# Patient Record
Sex: FEMALE | Race: BLACK OR AFRICAN AMERICAN
[De-identification: names, ages, dates, MRNs, and addresses within clinical notes are randomized per-mention and may not be internally consistent; named-entity substitution may affect disease eponyms.]

---

## 2016-05-20 VITALS
SYSTOLIC BLOOD PRESSURE: 138 MMHG | SYSTOLIC BLOOD PRESSURE: 138 MMHG | DIASTOLIC BLOOD PRESSURE: 90 MMHG | DIASTOLIC BLOOD PRESSURE: 90 MMHG | DIASTOLIC BLOOD PRESSURE: 90 MMHG | DIASTOLIC BLOOD PRESSURE: 90 MMHG | SYSTOLIC BLOOD PRESSURE: 138 MMHG | SYSTOLIC BLOOD PRESSURE: 138 MMHG

## 2017-07-24 ENCOUNTER — HOSPITAL ENCOUNTER (OUTPATIENT)
Dept: HOSPITAL 61 - MAMMO | Age: 57
Discharge: HOME | End: 2017-07-24
Attending: INTERNAL MEDICINE
Payer: COMMERCIAL

## 2017-07-24 DIAGNOSIS — Z12.31: Primary | ICD-10-CM

## 2017-07-24 NOTE — RAD
DATE: 7/24/2017



EXAM: DIGITAL SCREEN BILAT W/CAD



HISTORY: Routine screening



COMPARISON: 7/22/2016



This study was interpreted with the benefit of Computerized Aided Detection

(CAD).





The breast parenchyma shows scattered fibroglandular densities. Breast

parenchyma level B.





FINDINGS: A faint nodular opacity in the lateral aspect of the right breast is

unchanged. No new or enlarging breast densities are seen. Several faint

scattered microcalcifications are noted. No suspicious microcalcifications are

seen.  





IMPRESSION: Stable mammograms without evidence of malignancy.





BI-RADS CATEGORY: 2 BENIGN FINDING(S)



RECOMMENDED FOLLOW-UP: 12M 12 MONTH FOLLOW-UP



PQRS compliance statement: Patient information was entered into a reminder

system with a target due date     for the next mammogram.



Mammography is a sensitive method for finding small breast cancers, but it

does not detect them all and is not a substitute for careful clinical

examination.  A negative mammogram does not negate a clinically suspicious

finding and should not result in delay in biopsying a clinically suspicious

abnormality.



"Our facility is accredited by the American College of [employer] Mammography

Program."

## 2017-11-29 ENCOUNTER — HOSPITAL ENCOUNTER (OUTPATIENT)
Dept: HOSPITAL 61 - ECHO | Age: 57
Discharge: HOME | End: 2017-11-29
Attending: INTERNAL MEDICINE
Payer: COMMERCIAL

## 2017-11-29 DIAGNOSIS — R06.09: ICD-10-CM

## 2017-11-29 DIAGNOSIS — Z87.891: ICD-10-CM

## 2017-11-29 DIAGNOSIS — I10: ICD-10-CM

## 2017-11-29 DIAGNOSIS — I49.5: Primary | ICD-10-CM

## 2017-11-29 PROCEDURE — 78452 HT MUSCLE IMAGE SPECT MULT: CPT

## 2017-11-29 PROCEDURE — 93017 CV STRESS TEST TRACING ONLY: CPT

## 2017-11-29 PROCEDURE — 96375 TX/PRO/DX INJ NEW DRUG ADDON: CPT

## 2017-11-29 PROCEDURE — A9500 TC99M SESTAMIBI: HCPCS

## 2017-11-29 PROCEDURE — 96376 TX/PRO/DX INJ SAME DRUG ADON: CPT

## 2017-11-29 PROCEDURE — 96374 THER/PROPH/DIAG INJ IV PUSH: CPT

## 2017-11-29 PROCEDURE — 93306 TTE W/DOPPLER COMPLETE: CPT

## 2017-11-30 NOTE — RAD
--------------- APPROVED REPORT --------------





Test Type:          Pharmacological

Stress Nurse/Tech: Lynda Dawkins R.N.

Test Indications: dyspnea

Cardiac History: htn, smoker

Medications:     see ehr

Medical History: see ehr

Resting ECG:     SR

Resting Heart Rate: 55 bpm

Resting Blood Pressure: 135/87mmHg

Pretest Chest Pain: No chest pain



Nurse/Tech Notes

Lungs cta, heart tones regular, good radial pulse

Consent: The procedure was explained to the patient in lay terms. Informed consent was witnessed. Pa
eout was entered into [facility]. History and Stress Test performed by CAMI Beach, ELLIE (R) 
(N)



Pharm. Details

Pharmacologic stress testing was performed using 0.4mg per 5ml of regadenoson given intravenously ove
r 7-10 seconds.



Stress Symptoms

No chest pain or symptoms.



POST EXERCISE

Reason for Termination: Infusion complete

Target HR: No

Max HR: 90 bpm

Max Blood Pressure: 133/72mmHg

Chest Pain: No. 

Arrhythmia: No. 

ST Change: No. T wave inversion in lateral leads that resolved during recovery



INTERPRETATION

Stress EKG Conclusion: Baseline EKG showed sinus rhythm.  No ischemic changes at peak stress.  No arr
hythmias.



Imaging Protocol

IMAGE PROTOCOL: Rest Tc-99m/stress Tc-99m 2 days



Rest:            Stress:         Viability:   

Radiopharm.Tc99m CgwoxqblsVk33q Sestamibi

Zkuu28cVn            35mCi            

Img Date  11/29/2017 11/30/2017      

Inj-Img Iayl39tkg.           60min.           



Rest Admin Site:IV - Right AntecubitalAdministrator:DEMETRIO Byers

Stress Admin Site: IV - Right AntecubitalAdministrator: CAMI Beach, THERESET (R)(N)



STRESS DATA

End Diast. Vol.115.0mlAv. Heart Rate73.0bpm

End Syst. Vol.41.0mlCO Index BSA5.4L/min

Myocardial Pqqm833.0gEject. Jxkxogxx43.0%



Stress Rates

Pk. Fill Rate3.18EDV/secLVtime Pk. Fill 246.85msec

Pk. Empty Rate3.10ESV/secLVtime Pk. Imsiv386.72msec

1/3 Pk. Fill0.72EDV/sec



Stress Scores

Regional WT1.00Summed WT9.00

Regional WM0.00Summed WM1.00



Study quality was good.  Left Ventricular size was Normal at Rest and Stress.

Lung uptake was Normal.  Left Ventricular ejection fraction is 64%.

The rest and stress images show normal perfusion, normal contraction and thickening.



LV Perf. Quant

17 Seg. SSS0.00

17 Seg. SRS3.00

17 Seg. SDS0.00

Stress Defect Extent (% LAD)0.00Rest Defect Extent (% LAD)13.10Rev. Defect Extent (% LAD)0.00

Stress Defect Extent (% LCX) 0.00Rest Defect Extent (% LCX)0.00Rev. Defect Extent (% LCX)0.00

Stress Defect Extent (% RCA)0.00Rest Defect Extent (% RCA)0.00Rev. Defect Extent (% RCA)0.00

Stress Defect Extent (% BRAVO)0.00Rest Defect Extent (% BRAVO)5.00Rev. Defect Extent (% BRAVO)0.00



Conclusion

1. Regadenoson cardioisotope stress test did not show any evidence of ischemia or infarct.

2. Normal left ventricular systolic function with ejection fraction calculated at 64%.

3. Low risk for cardiac events.

## 2018-08-06 ENCOUNTER — HOSPITAL ENCOUNTER (OUTPATIENT)
Dept: HOSPITAL 61 - MAMMO | Age: 58
Discharge: HOME | End: 2018-08-06
Attending: INTERNAL MEDICINE
Payer: COMMERCIAL

## 2018-08-06 DIAGNOSIS — I11.0: ICD-10-CM

## 2018-08-06 DIAGNOSIS — Z87.891: ICD-10-CM

## 2018-08-06 DIAGNOSIS — E78.5: ICD-10-CM

## 2018-08-06 DIAGNOSIS — Z12.31: Primary | ICD-10-CM

## 2018-08-06 DIAGNOSIS — I50.32: ICD-10-CM

## 2018-08-06 PROCEDURE — 77067 SCR MAMMO BI INCL CAD: CPT

## 2018-11-16 ENCOUNTER — HOSPITAL ENCOUNTER (OUTPATIENT)
Dept: HOSPITAL 61 - MRI | Age: 58
Discharge: HOME | End: 2018-11-16
Attending: INTERNAL MEDICINE
Payer: COMMERCIAL

## 2018-11-16 DIAGNOSIS — D35.2: Primary | ICD-10-CM

## 2018-11-16 DIAGNOSIS — D18.03: ICD-10-CM

## 2018-11-16 PROCEDURE — A9585 GADOBUTROL INJECTION: HCPCS

## 2018-11-16 PROCEDURE — 74183 MRI ABD W/O CNTR FLWD CNTR: CPT

## 2018-11-16 PROCEDURE — 70553 MRI BRAIN STEM W/O & W/DYE: CPT

## 2018-11-16 NOTE — RAD
MRI Brain with and without contrast

 

History: Macroadenoma

 

Technique: Multiplanar, multi sequential pre and postcontrast MR imaging 

was performed of the brain.

 

Comparison: July 30, 2009

 

Findings: There is persistent focus of nonenhancement of the sella with 

associated T2 hyperintense signal although overall decreased in size, now 

primarily involving the right aspect of the sella with slight extent to 

the left of the midline. This now measures about 1.2 cm transverse by 0.8 

cm CC by 0.9 cm AP. Previously this measured about 1.6 cm transverse by 

0.9 cm CC by 1 cm AP and previously extended more to the left. There is 

again mild deviation of the infundibulum to the left which is less 

displaced on this exam than previously.

 

There is no new intracranial abnormality. There is again minimal T2 and 

FLAIR hyperintense signal of the supratentorial parenchyma greatest of the

bilateral periatrial white matter right greater than left, also a few tiny

foci of the deep white matter overall similar. There is no nodular 

parenchymal or leptomeningeal enhancement. There is preservation of the 

major intracranial flow-voids at the skull base. The cerebellar tonsils 

are normal in location.  There is patchy mild-to-moderate ethmoid air cell

mucosal thickening, also minimally of the maxillary sinuses greater on the

right and also minimally of the right sphenoid sinus. There is mild 

bilateral frontal sinus mucosal thickening. Findings were present 

previously, somewhat decreased mucosal thickening of the ethmoid air cells

in the interval. The mastoid air cells are aerated. There is preserved 

marrow signal of the clivus.

 

 

Impression:

1. There is persistent focus of nonenhancing T2 hyperintense signal of the

sella although different morphology than previously with decreased mass 

effect as well as displacement of the infundibulum, overall smaller in 

size and now primarily more greatly affecting the right aspect of the 

sella. 

2. There is similar minimal T2 and FLAIR hypertense signal of the 

supratentorial white matter, nonspecific findings possibly due to chronic 

microvascular ischemic disease, no abnormal intracranial enhancement.

3. There is paranasal sinus mucosal thickening as stated, present 

previously although somewhat decreased of the ethmoid air cells in 

interval.

 

Electronically signed by: Mani Berry MD (11/16/2018 12:13 PM) 

[facility]

## 2018-11-16 NOTE — RAD
Indication: Liver hemangioma. No symptoms.

 

TECHNIQUE: MRI of the abdomen with in and out of phase, axial without and 

with fat sat T2-weighted images, axial diffusion-weighted images, coronal 

T2 fat sat, axial T1-weighted image sequences were obtained. After 

infusion of contrast, axial T1-weighted image sequences were obtained at 

multiple intervals.

 

COMPARISON:

 

FINDINGS:

Heart is normal in size. No pericardial or pleural effusion. Clear lung 

bases.

The liver is normal in morphology without hepatic steatosis. There is a 8 

mm T2 hyperintense/T1 hypointense arterially enhancing lesion in the 

segment 4A of the liver (series 4 image 9) compatible with flash filling 

hemangioma. There is a segment 6 subcapsular 1.9 x 1.4 cm arterially 

enhancing wedge-shaped focus without T1 or T2 correlates most likely 

perfusion anomaly. No other liver lesions seen. Spleen is within normal 

limits. No gallstones or pericholecystic fluid. Diffuse fatty infiltration

is seen in the pancreas. No focal pancreatic lesion. Main pancreatic duct 

is not dilated. No intra or extrahepatic biliary duct dilation. Adrenal 

glands demonstrate no nodularity. No hydronephrosis. 1.4 cm simple cyst is

seen in the right kidney. No suspicious renal lesion. No enlarged 

retroperitoneal adenopathy. No bowel obstruction. No enhancing bone 

lesion.

 

IMPRESSION:

1. Small hemangioma in segment 4A. No suspicious liver lesion.

 

Electronically signed by: Ayaz Ventura DO (11/16/2018 12:14 PM) [id number]

## 2019-07-16 ENCOUNTER — HOSPITAL ENCOUNTER (OUTPATIENT)
Dept: HOSPITAL 61 - ECHO | Age: 59
Discharge: HOME | End: 2019-07-16
Attending: INTERNAL MEDICINE
Payer: COMMERCIAL

## 2019-07-16 DIAGNOSIS — I25.118: ICD-10-CM

## 2019-07-16 DIAGNOSIS — I51.7: Primary | ICD-10-CM

## 2019-07-16 PROCEDURE — 93306 TTE W/DOPPLER COMPLETE: CPT

## 2019-07-16 NOTE — CARD
MR#: [id number]

Account#: [id number]

Accession#: [id number]

Date of Study: 07/16/2019

Ordering Physician: BALDEV DOUGLAS, 

Referring Physician: BALDEV DOUGLAS, 

Tech: Hanane Sarkar DARSHANA





--------------- APPROVED REPORT --------------





EXAM: Two-dimensional and M-mode echocardiogram with Doppler and color Doppler.



Other Information 

Quality : AverageHR: 66bpm

Rhythm : NSR



INDICATION

Hypertension/HCVD



2D DIMENSIONS 

RVDd3.2 (2.9-3.5cm)Left Atrium(2D)3.4 (1.6-4.0cm)

IVSd1.3 (0.7-1.1cm)Aortic Root(2D)3.0 (2.0-3.7cm)

LVDd5.0 (3.9-5.9cm)LVOT Diameter1.8 (1.8-2.4cm)

PWd1.1 (0.7-1.1cm)LVDs3.1 (2.5-4.0cm)

FS (%) 38.5 %SV80.4 ml

LVEF(%)65.0 (>50%)



M-Mode DIMENSIONS 

Left Atrium(MM)3.65 (2.5-4.0cm)Aortic Root3.51 (2.2-3.7cm)



Aortic Valve

AoV Peak Alfred.154.0cm/sAoV VTI32.0cm

AO Peak GR.9.5mmHgLVOT Peak Alfred.113.2cm/s

AO Mean GR.5mmHgAVA (VMAX)1.86cm2

CORY   (VTI)1.90cm2



Mitral Valve

MV E Ycqpfmnn04.0cm/sMV DECEL MBCK307ad

MV A Qheqclhq60.5cm/sE/A  Ratio0.8



Pulmonary Valve

PV Peak Woyucafn68.1cm/s



Tricuspid Valve

TR P. Wwwipwas319fb/sRAP LVPPUTEV9idDl

TR Peak Gr.31gsQqIMST59qhUx



Pulmonary Vein

S1 Yxgyyqve75.0cm/sD2 Dxiekxxb73.4cm/s

PVa fnuhyjvi34evgo



 LEFT VENTRICLE 

The left ventricle is normal size. There is mild concentric left ventricular hypertrophy. The left ve
ntricular systolic function is normal. The Ejection Fraction is 60-65%. There is normal LV segmental 
wall motion. Transmitral Doppler flow pattern is Grade I-abnormal relaxation pattern.



 RIGHT VENTRICLE 

The right ventricle is normal size. There is normal right ventricular wall thickness. The right ventr
icular systolic function is normal.



 ATRIA 

The left atrium size is normal. The right atrium size is normal. The interatrial septum is intact wit
h no evidence for an atrial septal defect or patent foramen ovale as noted on 2-D or Doppler imaging.




 AORTIC VALVE 

The aortic valve is normal in structure and function. The aortic valve is trileaflet. Doppler and Col
or Flow revealed no significant aortic regurgitation. There is no significant aortic valvular stenosi
s. There is no aortic valvular vegetation.



 MITRAL VALVE 

The mitral valve is normal in structure and function. There is no evidence of mitral valve prolapse. 
There is no mitral valve stenosis. Doppler and Color-flow revealed trace mitral regurgitation.



 TRICUSPID VALVE 

The tricuspid valve is normal in structure and function. Doppler and Color Flow revealed trace tricus
pid regurgitation. The PA pressure was estimated at 32 mmHg. There is no tricuspid valve prolapse or 
vegetation. There is no tricuspid valve stenosis.



 PULMONIC VALVE 

The pulmonary valve is normal in structure and function. Doppler and Color Flow revealed no pulmonic 
valvular regurgitation. There is no pulmonic valvular stenosis.



 GREAT VESSELS 

The aortic root is normal in size. The ascending aorta is normal in size. The IVC is normal in size a
nd collapses >50% with inspiration.



 PERICARDIAL EFFUSION 

There is no evidence of significant pericardial effusion.



Critical Notification

Critical Value: No



<Conclusion>

The left ventricular systolic function is normal.

The Ejection Fraction is 60-65%.

There is normal LV segmental wall motion.

Transmitral Doppler flow pattern is Grade I-abnormal relaxation pattern.

Trace mitral regurgitation.

Trace tricuspid regurgitation.

The PA pressure was estimated at 32 mmHg.

There is no evidence of significant pericardial effusion.



Signed by : Randolph Reyes, 

Electronically Approved : 07/16/2019 13:34:48

## 2019-09-12 ENCOUNTER — HOSPITAL ENCOUNTER (EMERGENCY)
Dept: HOSPITAL 61 - ER | Age: 59
Discharge: HOME | End: 2019-09-12
Payer: COMMERCIAL

## 2019-09-12 VITALS
SYSTOLIC BLOOD PRESSURE: 155 MMHG | SYSTOLIC BLOOD PRESSURE: 155 MMHG | SYSTOLIC BLOOD PRESSURE: 155 MMHG | DIASTOLIC BLOOD PRESSURE: 79 MMHG | DIASTOLIC BLOOD PRESSURE: 79 MMHG | DIASTOLIC BLOOD PRESSURE: 79 MMHG | DIASTOLIC BLOOD PRESSURE: 79 MMHG | SYSTOLIC BLOOD PRESSURE: 155 MMHG | DIASTOLIC BLOOD PRESSURE: 79 MMHG | SYSTOLIC BLOOD PRESSURE: 155 MMHG | SYSTOLIC BLOOD PRESSURE: 155 MMHG | DIASTOLIC BLOOD PRESSURE: 79 MMHG

## 2019-09-12 VITALS — WEIGHT: 270 LBS | BODY MASS INDEX: 46.1 KG/M2 | HEIGHT: 64 IN

## 2019-09-12 DIAGNOSIS — J01.01: Primary | ICD-10-CM

## 2019-09-12 DIAGNOSIS — I11.0: ICD-10-CM

## 2019-09-12 DIAGNOSIS — I50.9: ICD-10-CM

## 2019-09-12 DIAGNOSIS — G89.29: ICD-10-CM

## 2019-09-12 DIAGNOSIS — Z90.710: ICD-10-CM

## 2019-09-12 DIAGNOSIS — F31.9: ICD-10-CM

## 2019-09-12 DIAGNOSIS — F20.9: ICD-10-CM

## 2019-09-12 DIAGNOSIS — E78.00: ICD-10-CM

## 2019-09-12 DIAGNOSIS — Z98.51: ICD-10-CM

## 2019-09-12 DIAGNOSIS — Z88.1: ICD-10-CM

## 2019-09-12 PROCEDURE — 99282 EMERGENCY DEPT VISIT SF MDM: CPT

## 2019-09-12 NOTE — PHYS DOC
Past Medical History


Past Medical History:  Bipolar, Bronchitis, CHF, Depression, High Cholesterol, 

Hypertension, Schizophrenia, Other


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 

drug user, CHRONIC BACK PAIN


Past Surgical History:  Hysterectomy, Tubal ligation


Alcohol Use:  None


Drug Use:  None





Adult General


Chief Complaint


Chief Complaint:  Congestion





HPI


HPI





Patient is a 59  year old female that presents with sinus congestion has been 

ongoing for week. Associated symptoms include sinus pressure in her maxillary 

sinuses. The patient has not tried any treatments at home for this. Denies 

Cough. Denies any fevers. Denies any pain.





Review of Systems


Review of Systems





Constitutional: Denies fever or chills []


Eyes: Denies change in visual acuity, redness, or eye pain []


HENT: Reports nasal congestion and sinus pressure.


Respiratory: Denies cough or shortness of breath []


Cardiovascular: No additional information not addressed in HPI []


GI: Denies abdominal pain, nausea, vomiting, bloody stools or diarrhea []


: Denies dysuria or hematuria []


Musculoskeletal: Denies back pain or joint pain []


Integument: Denies rash or skin lesions []


Neurologic: Denies headache, focal weakness or sensory changes []


Endocrine: Denies polyuria or polydipsia []





Complete systems were reviewed and found to be within normal limits, except as 

documented in this note.





Allergies


Allergies





Allergies








Coded Allergies Type Severity Reaction Last Updated Verified


 


  amoxicillin Adverse Reaction Intermediate nausea 12/3/14 No











Physical Exam


Physical Exam





Constitutional: Well developed, well nourished, no acute distress, non-toxic 

appearance. []


HENT: Normocephalic, atraumatic, bilateral external ears normal, oropharynx 

moist, no oral exudates, nose normal. Sinus tenderness.


Eyes: PERRLA, EOMI, conjunctiva normal, no discharge. [] 


Neck: Normal range of motion, no tenderness, supple, no stridor. [] 


Cardiovascular:Heart rate regular rhythm, no murmur []


Lungs & Thorax:  Bilateral breath sounds clear to auscultation []


Abdomen: Bowel sounds normal, soft, no tenderness, no masses, no pulsatile 

masses. [] 


Skin: Warm, dry, no erythema, no rash. [] 


Back: No tenderness, no CVA tenderness. [] 


Extremities: No tenderness, no cyanosis, no clubbing, ROM intact, no edema. [] 


Neurologic: Alert and oriented X 3, normal motor function, normal sensory 

function, no focal deficits noted. []


Psychologic: Affect normal, judgement normal, mood normal. []





EKG


EKG


[]





Radiology/Procedures


Radiology/Procedures


[]





Course & Med Decision Making


Course & Med Decision Making


Pertinent Labs and Imaging studies reviewed. (See chart for details)





The patient appears to have a sinus infection. Will give a steroid in the ER 

(Dexamethasone). Also recommended the patient drink plenty of fluid. Discussed 

with patient about taking Mucinex and Flonase. The patient asked if I could 

write a script for Mucinex and I agreed. The patient has Flonase at home and 

states she has saline solution at home. Recommended patient use this as well.





Dragon Disclaimer


Dragon Disclaimer


This electronic medical record was generated, in whole or in part, using a voice

 recognition dictation system.





Departure


Departure


Impression:  


   Primary Impression:  


   Sinusitis


Disposition:  01 HOME, SELF-CARE


Condition:  STABLE


Referrals:  


DUKE FONTENOT MD (PCP)








JS LINDSAY MD


Patient Instructions:  Sinusitis





Additional Instructions:  


Thank you for visiting [facility]. We appreciate you trusting us 

with your care. If any additional problems come up don't hesitate to return to 

visit us. Please follow up with your primary care provider so they can plan 

additional care if needed and know about the problem that you had. If symptoms 

worsen come back to the Emergency Department. Any concerning symptoms that start

 such as chest pain, shortness of air, weakness or numbness on one side of the 

body, running high fevers or any other concerning symptoms return to the ER.





Please fill your medications at any pharmacy and follow the prescription 

instructions.





As we discussed you can also try Flonase and Saline solution at home. If 

symptoms continue please follow up with ENT.


Scripts


Guaifenesin (MUCINEX) 600 Mg Tablet.er


1 TAB PO BID PRN for CONGESTION, #20 TAB


   Prov: MICHELLE TROY         9/12/19





Problem Qualifiers








   Primary Impression:  


   Sinusitis


   Sinusitis location:  maxillary  Chronicity:  acute  Recurrence:  recurrent  

   Qualified Codes:  J01.01 - Acute recurrent maxillary sinusitis








MICHELLE TROY          Sep 12, 2019 09:37

## 2019-11-18 ENCOUNTER — HOSPITAL ENCOUNTER (OUTPATIENT)
Dept: HOSPITAL 61 - MAMMO | Age: 59
Discharge: HOME | End: 2019-11-18
Attending: INTERNAL MEDICINE
Payer: COMMERCIAL

## 2019-11-18 DIAGNOSIS — Z12.31: Primary | ICD-10-CM

## 2019-11-18 DIAGNOSIS — N64.89: ICD-10-CM

## 2019-11-18 PROCEDURE — 77067 SCR MAMMO BI INCL CAD: CPT

## 2019-11-18 NOTE — RAD
DATE: 11/18/2019.



EXAM: DIGITAL SCREEN BILAT W/CAD.



HISTORY: Routine mammographic screening.



COMPARISON: 8/6/2018.



This study was interpreted with the benefit of Computerized Aided Detection

(CAD).



FINDINGS:



Breast Density:  SCATTERED The breast parenchyma shows scattered

fibroglandular densities. Breast parenchyma level B..



There are no suspicious masses, microcalcifications or architectural

distortion.  Scattered calcifications are benign. The parenchymal pattern is

stable.



BI-RADS CATEGORY: 2 BENIGN FINDING(S).



RECOMMENDED FOLLOW-UP: 12M 12 MONTH FOLLOW-UP.



PQRS compliance statement: Patient information was entered into a reminder

system with a target due date 11/18/2020 for the next mammogram.



Mammography is a sensitive method for finding small breast cancers, but it

does not detect them all and is not a substitute for careful clinical

examination.  A negative mammogram does not negate a clinically suspicious

finding and should not result in delay in biopsying a clinically suspicious

abnormality.



"Our facility is accredited by the American College of [employer] Mammography

Program."

## 2020-11-23 ENCOUNTER — HOSPITAL ENCOUNTER (OUTPATIENT)
Dept: HOSPITAL 61 - MAMMO | Age: 60
End: 2020-11-23
Attending: INTERNAL MEDICINE
Payer: COMMERCIAL

## 2020-11-23 DIAGNOSIS — Z12.31: Primary | ICD-10-CM

## 2020-11-23 PROCEDURE — 77067 SCR MAMMO BI INCL CAD: CPT

## 2020-11-23 PROCEDURE — 77063 BREAST TOMOSYNTHESIS BI: CPT

## 2020-11-24 NOTE — RAD
DATE: 11/23/2020 8:02 AM



EXAM: MAMMO MANJULA SCREENING BILATERAL



HISTORY:  Screening



COMPARISON: 11/18/2019, 7/24/2017



Bilateral CC and MLO views of the breasts were performed. Bilateral breast

tomosynthesis was performed in CC and MLO projections.



This study was interpreted with the benefit of Computerized Aided Detection

(CAD).





FINDINGS:



Breast Density: SCATTERED  The breast parenchyma shows scattered

fibroglandular densities. Breast parenchyma level B





No suspicious masses, microcalcifications or architectural distortion is

present to suggest malignancy in either breast.





The visualized axillae are unremarkable. 



IMPRESSION: No mammographic evidence of malignancy. 



BI-RADS CATEGORY: 1 NEGATIVE



RECOMMENDED FOLLOW-UP: 12M 12 MONTH FOLLOW-UP Annual screening mammography is

recommended, unless clinically indicated sooner based on symptoms or change in

physical exam.



PQRS compliance statement: Patient information was entered into a reminder

system with a target due date for the next mammogram.



Mammography is a sensitive method for finding small breast cancers, but it

does not detect them all and is not a substitute for careful clinical

examination.  A negative mammogram does not negate a clinically suspicious

finding and should not result in delay in biopsying a clinically suspicious

abnormality.



"Our facility is accredited by the American College of [employer] Mammography

Program."

## 2021-06-29 ENCOUNTER — HOSPITAL ENCOUNTER (OUTPATIENT)
Dept: HOSPITAL 61 - ECHO | Age: 61
End: 2021-06-29
Attending: INTERNAL MEDICINE
Payer: MEDICARE

## 2021-06-29 DIAGNOSIS — R06.09: ICD-10-CM

## 2021-06-29 DIAGNOSIS — I11.9: Primary | ICD-10-CM

## 2021-06-29 PROCEDURE — 93306 TTE W/DOPPLER COMPLETE: CPT

## 2021-06-29 NOTE — CARD
MR#: [id number]

Account#: [id number]

Accession#: [id number]

Date of Study: 06/29/2021

Ordering Physician: JOLEEN REYES, 

Referring Physician: Junior ZURITA: Otilio Garcia [facility]





--------------- APPROVED REPORT --------------





EXAM: Two-dimensional and M-mode echocardiogram with Doppler and color Doppler.



Other Information 

Quality : AverageHR: 82bpm

Rhythm : NSR



INDICATION

Dyspnea 



RISK FACTORS

Hypertension 

Obesity   

Hyperlipidemia

Smoking 



2D DIMENSIONS 

Left Atrium(2D)3.8 (1.6-4.0cm)IVSd1.3 (0.7-1.1cm)

Aortic Root(2D)3.3 (2.0-3.7cm)LVDd4.6 (3.9-5.9cm)

LVOT Diameter1.8 (1.8-2.4cm)PWd1.3 (0.7-1.1cm)

LVDs2.5 (2.5-4.0cm)FS (%) 44.9 %

SV74.2 mlLVEF(%)76.3 (>50%)



Aortic Valve

AoV Peak Alfred.171.5cm/sAoV VTI34.4cm

AO Peak GR.11.8mmHgLVOT Peak Alfred.126.7cm/s

AO Mean GR.6mmHgAVA (VMAX)1.94cm2



Mitral Valve

MV E Nvfzmxpl95.0cm/sMV E Peak Gr.3mmHg

MV DECEL BBDB140naLZ A Oqiddgoo33.3cm/s

MV E Mean Gr.1mmHgE/A  Ratio0.6



Pulmonary Valve

PV Peak Iddqsqln33.1cm/s



Tricuspid Valve

TR P. Cttmjmdr225rb/sTR Peak Gr.9mmHg



Pulmonary Vein

S1 Ivcjvyae81.3cm/sD2 Cnxgedad50.0cm/s



 LEFT VENTRICLE 

The left ventricle is normal size. There is mild to moderate concentric left ventricular hypertrophy.
 The left ventricular systolic function is normal. The ejection fraction is 60%. There is normal LV s
egmental wall motion. Transmitral Doppler flow pattern is Grade I-abnormal relaxation pattern. No lef
t ventricle thrombus noted on this study. There is no ventricular septal defect visualized. There is 
no left ventricular aneurysm. There is no mass noted in the left ventricle.



 RIGHT VENTRICLE 

The right ventricle is normal size. There is normal right ventricular wall thickness. The right ventr
icular systolic function is normal.



 ATRIA 

The left atrium is mildly dilated. The right atrium size is normal. The interatrial septum is intact 
with no evidence for an atrial septal defect or patent foramen ovale as noted on 2-D or Doppler imagi
ng.



 AORTIC VALVE 

The aortic valve is normal in structure and function. Doppler and Color Flow revealed no significant 
aortic regurgitation. There is no significant aortic valvular stenosis. There is no aortic valvular v
egetation.



 MITRAL VALVE 

The mitral valve is normal in structure and function. There is no evidence of mitral valve prolapse. 
There is no mitral valve stenosis. Doppler and Color-flow revealed trace mitral regurgitation.



 TRICUSPID VALVE 

The tricuspid valve is normal in structure and function. Doppler and Color Flow revealed trace tricus
pid regurgitation. There is no tricuspid valve prolapse or vegetation. There is no tricuspid valve st
enosis.



 PULMONIC VALVE 

Doppler and Color Flow revealed no pulmonic valvular regurgitation. There is no pulmonic valvular thierry
nosis.



 GREAT VESSELS 

The aortic root is normal in size. The ascending aorta is normal in size. The pulmonary artery is nor
mal. The IVC is normal in size and collapses >50% with inspiration.



 PERICARDIAL EFFUSION 

There is no pleural effusion. There is no evidence of significant pericardial effusion.



Critical Notification

Critical Value: No



<Conclusion>

The left ventricular systolic function is normal.

The ejection fraction is 60%.

There is normal LV segmental wall motion.

Transmitral Doppler flow pattern is Grade I-abnormal relaxation pattern.

Trace mitral regurgitation.

Trace tricuspid regurgitation.

There is no evidence of significant pericardial effusion.



Signed by : Joleen Reyes, 

Electronically Approved : 06/29/2021 12:44:14

## 2021-08-10 ENCOUNTER — HOSPITAL ENCOUNTER (OUTPATIENT)
Dept: HOSPITAL 61 - NM | Age: 61
End: 2021-08-10
Attending: INTERNAL MEDICINE
Payer: MEDICARE

## 2021-08-10 DIAGNOSIS — R40.0: Primary | ICD-10-CM

## 2021-08-10 PROCEDURE — 93017 CV STRESS TEST TRACING ONLY: CPT

## 2021-08-10 PROCEDURE — A9500 TC99M SESTAMIBI: HCPCS

## 2021-08-10 PROCEDURE — 78452 HT MUSCLE IMAGE SPECT MULT: CPT

## 2021-08-11 NOTE — RAD
MR#: [id number]

Account#: [id number]

Accession#: [id number]

Date of Study: 08/11/2021

Ordering Physician: JOLEEN MARY, 

Referring Physician: PABLO ZURITA Tech: RT RUBIA Carmona) (N)





--------------- APPROVED REPORT --------------





Test Type:          Pharmacological

Stress Nurse/Tech: ADEBAYO Winston RN

Test Indications: Dyspnea on exertion

Cardiac History: CHF, HTN, See EMR.

Medications:     See EMR.

Medical History: x-Smoker=quit 20yrs ago, Brain tumor=left side, See EMR.

Resting ECG:     SR

Resting Heart Rate: 72 bpm

Resting Blood Pressure: 169/94mmHg

Pretest Chest Pain: No chest pain



Nurse/Tech Notes

Lungs CTA, Heart tones regular.

Consent: The procedure was explained to the patient in lay terms. Informed consent was witnessed. Pa
eout was entered into [facility]. History and Stress Test performed by RT Katiuska (R) (N)



Pharm. Details

Pharmacologic stress testing was performed using 0.4mg per 5ml of regadenoson given intravenously ove
r 7-10 seconds.



Stress Symptoms

No chest pain or symptoms.



POST EXERCISE

Reason for Termination: Infusion complete

Max HR: 105 bpm

Max Blood Pressure: 167/91mmHg

Blood Pressure response to exercise: Normal blood pressure response during stress.

Heart Rate response to exercise: WNL

Chest Pain: No. 

Arrhythmia: No. 

ST Change: No. 



INTERPRETATION

Stress EKG Conclusion: Baseline EKG showed sinus rhythm.  No ischemic changes at peak stress.  No arr
hythmias.



Imaging Protocol

IMAGE PROTOCOL: Rest Tc-99m/stress Tc-99m 2 days



Rest:            Stress:         Viability:   

Radiopharm.Tc99m [id number] Sestamibi

[id number]            32.3mCi            

Duration    15min.           15min.           

Img Date  08/10/2021      08/11/2021      

Inj-Img [id number].           60min.           



Rest Admin Site:IV - Right AntecubitalAdministrator:RT RUBIA Harp)(N)

Stress Admin Site: IV - Left AntecubitalAdministrator: RT Katiuska (WALDO)(N)



STRESS DATA

End Diast. Vol.105.0mlAv. Heart Rate85.0bpm

End Syst. Vol.31.0mlCO Index BSA0.0L/min

Myocardial Xbul354.0gEject. Wijtkrya42.0%



Stress Rates

Pk. Fill Rate3.33EDV/secLVtime Pk. Fill 161.53msec

Pk. Empty Rate4.04ESV/secLVtime Pk. Irewy182.75msec

1/3 Pk. Fill1.23EDV/sec



Stress Scores

Regional WT1.00Summed WT10.00

Regional WM0.00Summed WM0.00



Study quality was good.  Left Ventricular size was Normal at Rest and Stress.

Lung uptake was .  Left Ventricular ejection fraction is 70%.

The rest and stress images show normal perfusion, normal contraction and thickening.



LV Perf. Quant

17 Seg. SSS0.00

17 Seg. SRS1.00

17 Seg. SDS0.00

Stress Defect Extent (% LAD)0.00Rest Defect Extent (% LAD)0.00Rev. Defect Extent (% LAD)0.00

Stress Defect Extent (% LCX) 0.00Rest Defect Extent (% LCX)0.00Rev. Defect Extent (% LCX)0.00

Stress Defect Extent (% RCA)0.00Rest Defect Extent (% RCA)0.00Rev. Defect Extent (% RCA)0.00

Stress Defect Extent (% BRAVO)0.00Rest Defect Extent (% BRAVO)0.00Rev. Defect Extent (% BRAVO)0.00



Conclusion

1. Regadenoson cardioisotope stress test did not show any evidence of ischemia or infarct.

2. Normal left ventricular systolic function with ejection fraction calculated at 70%.

3. Low risk for cardiac events.



Signed by : Joleen Mary, 

Electronically Approved : 08/11/2021 16:25:29

## 2022-04-21 ENCOUNTER — HOSPITAL ENCOUNTER (OUTPATIENT)
Dept: HOSPITAL 61 - MAMMO | Age: 62
End: 2022-04-21
Attending: INTERNAL MEDICINE
Payer: MEDICARE

## 2022-04-21 DIAGNOSIS — Z12.31: Primary | ICD-10-CM

## 2022-04-21 PROCEDURE — 77067 SCR MAMMO BI INCL CAD: CPT

## 2022-04-21 PROCEDURE — 77063 BREAST TOMOSYNTHESIS BI: CPT

## 2022-04-21 NOTE — RAD
EXAMINATION: MG BILAT SCREEN+MANJULA



CLINICAL HISTORY: Screening



TECHNIQUE: Digital craniocaudal and mediolateral oblique views of the bilateral breasts obtained with
 3-D tomosynthesis.



COMPARISON: 11/23/2020, 11/18/2019, 8/6/2018, 7/24/2017, 7/22/2016



BREAST COMPOSITION: There are scattered areas of fibroglandular density.





FINDINGS:  



No evidence of suspicious mass, calcifications, or areas of architectural distortion.





IMPRESSION:  



No mammographic evidence of malignancy.



BI-RADS ASSESSMENT:  



Category 1: Negative



RECOMMENDATION:  



Return for routine bilateral screening mammogram in one year.





Patient information is entered into the reminder system with a target due date for the next screening
 mammogram.



"Our facility is accredited by the [employer] Mammography Program."



Electronically signed by: Sergio Patrick DO (4/21/2022 10:19 AM) [id number]